# Patient Record
Sex: FEMALE | Race: OTHER | URBAN - METROPOLITAN AREA
[De-identification: names, ages, dates, MRNs, and addresses within clinical notes are randomized per-mention and may not be internally consistent; named-entity substitution may affect disease eponyms.]

---

## 2024-06-29 ENCOUNTER — HOSPITAL ENCOUNTER (EMERGENCY)
Facility: HOSPITAL | Age: 49
Discharge: HOME | End: 2024-06-29
Attending: EMERGENCY MEDICINE

## 2024-06-29 VITALS
DIASTOLIC BLOOD PRESSURE: 83 MMHG | RESPIRATION RATE: 18 BRPM | TEMPERATURE: 97.6 F | SYSTOLIC BLOOD PRESSURE: 145 MMHG | OXYGEN SATURATION: 99 % | HEART RATE: 83 BPM

## 2024-06-29 DIAGNOSIS — H10.32 ACUTE BACTERIAL CONJUNCTIVITIS OF LEFT EYE: Primary | ICD-10-CM

## 2024-06-29 PROCEDURE — 99283 EMERGENCY DEPT VISIT LOW MDM: CPT

## 2024-06-29 PROCEDURE — 6370000000 HC RX 637 (ALT 250 FOR IP)

## 2024-06-29 RX ORDER — TETRACAINE HYDROCHLORIDE 5 MG/ML
1 SOLUTION OPHTHALMIC ONCE
Status: COMPLETED | OUTPATIENT
Start: 2024-06-29 | End: 2024-06-29

## 2024-06-29 RX ORDER — LEVOFLOXACIN 15 MG/ML
1 SOLUTION/ DROPS OPHTHALMIC
Qty: 2 ML | Refills: 0 | Status: SHIPPED | OUTPATIENT
Start: 2024-06-29 | End: 2024-07-09

## 2024-06-29 RX ADMIN — FLUORESCEIN SODIUM 1 MG: 1 STRIP OPHTHALMIC at 01:33

## 2024-06-29 RX ADMIN — TETRACAINE HYDROCHLORIDE 1 DROP: 5 SOLUTION OPHTHALMIC at 01:33

## 2024-06-29 ASSESSMENT — PAIN - FUNCTIONAL ASSESSMENT
PAIN_FUNCTIONAL_ASSESSMENT: 0-10
PAIN_FUNCTIONAL_ASSESSMENT: ACTIVITIES ARE NOT PREVENTED

## 2024-06-29 ASSESSMENT — PAIN DESCRIPTION - PAIN TYPE: TYPE: ACUTE PAIN

## 2024-06-29 ASSESSMENT — PAIN DESCRIPTION - ORIENTATION: ORIENTATION: LEFT

## 2024-06-29 ASSESSMENT — PAIN DESCRIPTION - FREQUENCY: FREQUENCY: CONTINUOUS

## 2024-06-29 ASSESSMENT — ENCOUNTER SYMPTOMS
EYE PAIN: 1
EYE REDNESS: 1
EYE ITCHING: 1

## 2024-06-29 ASSESSMENT — PAIN DESCRIPTION - DESCRIPTORS: DESCRIPTORS: DISCOMFORT

## 2024-06-29 ASSESSMENT — PAIN DESCRIPTION - ONSET: ONSET: ON-GOING

## 2024-06-29 ASSESSMENT — TONOMETRY: OS_IOP_MMHG: 17

## 2024-06-29 ASSESSMENT — PAIN DESCRIPTION - LOCATION: LOCATION: EYE

## 2024-06-29 ASSESSMENT — PAIN SCALES - GENERAL: PAINLEVEL_OUTOF10: 8

## 2024-06-29 NOTE — ED NOTES
Attempted visual acuity test, patient stated she could not read anything on the snellen chart using both eyes.

## 2024-06-29 NOTE — ED PROVIDER NOTES
[facility] EMERGENCY DEP  EMERGENCY DEPARTMENT ENCOUNTER      Pt Name: Love Harper  MRN: [id number]  Birthdate 1975  Date of evaluation: 6/29/2024  Provider: Suzy Holt PA-C    CHIEF COMPLAINT       Chief Complaint   Patient presents with    Eye Problem         HISTORY OF PRESENT ILLNESS   (Location/Symptom, Timing/Onset, Context/Setting, Quality, Duration, Modifying Factors, Severity)  Note limiting factors.   Love Harper is a 49 y.o. female with no significant past medical history who presents from home to [facility] ED with cc of left eye pain and redness beginning today.  Patient does wear contacts.  She has removed them.  Denies trauma.  Reports the eye feels itchy.  No vision changes.  Denies trauma or injury to the eye.  Denies foreign body sensation of the eye.      PCP: No primary care provider on file.    There are no other complaints, changes or physical findings at this time.        The history is provided by the patient. No  was used.         Review of External Medical Records:     Nursing Notes were reviewed.    REVIEW OF SYSTEMS    (2-9 systems for level 4, 10 or more for level 5)     Review of Systems   Eyes:  Positive for pain, redness and itching.       Except as noted above the remainder of the review of systems was reviewed and negative.       PAST MEDICAL HISTORY   No past medical history on file.      SURGICAL HISTORY     No past surgical history on file.      CURRENT MEDICATIONS       Discharge Medication List as of 6/29/2024  1:54 AM          ALLERGIES     Metronidazole    FAMILY HISTORY     No family history on file.       SOCIAL HISTORY       Social History     Socioeconomic History    Marital status:            PHYSICAL EXAM    (up to 7 for level 4, 8 or more for level 5)     ED Triage Vitals   BP Temp Temp src Pulse Resp SpO2 Height Weight   -- -- -- -- -- -- -- --       There is no height or weight on file to calculate BMI.    Physical  microtrauma (dust, sand, etc). Negative Ngoc sign, no sign of corneal abrasion/ulcer on Woods lamp exam. No history of discharge so less likely bacterial or viral conjunctivitis. No significant photophobia. IOP is 17 so doubt acute angle closure glaucoma. Given history and exam I have low suspicion for globe rupture, uveitis, HSV keratitis, Endopthalmitist, Foreign Body. Patient likely has conjunctivitis and was prescribed levofloxacin eyedrops to cover Pseudomonas as she is a contact lens wearer.  She will follow-up closely with her eye doctor.  Strict return precautions were given.    Discussed my clinical impression(s), any labs and/or radiology results with the patient. I answered any questions and addressed any concerns. Discussed the importance of following up with their primary care physician and/or specialist(s). Discussed signs or symptoms that would warrant return back to the ER for further evaluation. The patient is agreeable with discharge.      Risk  Prescription drug management.            REASSESSMENT            CONSULTS:  None    PROCEDURES:  Unless otherwise noted below, none     Procedures      FINAL IMPRESSION      1. Acute bacterial conjunctivitis of left eye          DISPOSITION/PLAN   DISPOSITION Decision To Discharge 06/29/2024 01:54:13 AM      PATIENT REFERRED TO:  [facility] EMERGENCY DEP  [address] [place], [place] [id number] [phone]  Go to   If symptoms worsen    Your Eye doctor            DISCHARGE MEDICATIONS:  Discharge Medication List as of 6/29/2024  1:54 AM        START taking these medications    Details   levoFLOXacin (QUIXIN) 1.5 % SOLN Place 1 drop into the left eye every 4 hours (while awake) for 10 days, Disp-2 mL, R-0Print               (Please note that portions of this note were completed with a voice recognition program.  Efforts were made to edit the dictations but occasionally words are mis-transcribed.)    Suzy Holt PA-C (electronically signed)  Emergency

## 2024-06-29 NOTE — DISCHARGE INSTRUCTIONS
Follow-up with your eye doctor as soon as possible.  Take the eyedrops as prescribed.  Do not wear your contacts until this infection has resolved.  Return to the emergency department for any new or worsening symptoms.

## 2024-06-29 NOTE — ED TRIAGE NOTES
Pt ambulatory to triage co L eye discomfort and swelling x 1 day. States that she wears contacts, but took her lens out already. Pt tried eye drops with no relief. Denies nay injury or trauma to the area. No vision changes